# Patient Record
Sex: FEMALE | Race: WHITE | URBAN - METROPOLITAN AREA
[De-identification: names, ages, dates, MRNs, and addresses within clinical notes are randomized per-mention and may not be internally consistent; named-entity substitution may affect disease eponyms.]

---

## 2017-04-15 ENCOUNTER — TRANSFERRED RECORDS (OUTPATIENT)
Dept: HEALTH INFORMATION MANAGEMENT | Facility: CLINIC | Age: 22
End: 2017-04-15

## 2017-04-15 LAB — PAP SMEAR - HIM PATIENT REPORTED: NEGATIVE

## 2017-06-12 ENCOUNTER — OFFICE VISIT (OUTPATIENT)
Dept: PEDIATRICS | Facility: CLINIC | Age: 22
End: 2017-06-12
Payer: COMMERCIAL

## 2017-06-12 VITALS
TEMPERATURE: 98.5 F | SYSTOLIC BLOOD PRESSURE: 118 MMHG | HEART RATE: 98 BPM | HEIGHT: 64 IN | DIASTOLIC BLOOD PRESSURE: 66 MMHG | OXYGEN SATURATION: 99 % | BODY MASS INDEX: 24.48 KG/M2 | WEIGHT: 143.4 LBS

## 2017-06-12 DIAGNOSIS — B96.89 BACTERIAL VAGINOSIS: Primary | ICD-10-CM

## 2017-06-12 DIAGNOSIS — Z11.3 SCREEN FOR STD (SEXUALLY TRANSMITTED DISEASE): ICD-10-CM

## 2017-06-12 DIAGNOSIS — N89.8 VAGINAL DISCHARGE: Primary | ICD-10-CM

## 2017-06-12 DIAGNOSIS — N76.0 BACTERIAL VAGINOSIS: Primary | ICD-10-CM

## 2017-06-12 LAB
MICRO REPORT STATUS: ABNORMAL
SPECIMEN SOURCE: ABNORMAL
WET PREP SPEC: ABNORMAL

## 2017-06-12 PROCEDURE — 99213 OFFICE O/P EST LOW 20 MIN: CPT | Performed by: NURSE PRACTITIONER

## 2017-06-12 PROCEDURE — 87210 SMEAR WET MOUNT SALINE/INK: CPT | Performed by: NURSE PRACTITIONER

## 2017-06-12 PROCEDURE — 87591 N.GONORRHOEAE DNA AMP PROB: CPT | Performed by: NURSE PRACTITIONER

## 2017-06-12 PROCEDURE — 87491 CHLMYD TRACH DNA AMP PROBE: CPT | Performed by: NURSE PRACTITIONER

## 2017-06-12 RX ORDER — METRONIDAZOLE 500 MG/1
500 TABLET ORAL 2 TIMES DAILY
Qty: 14 TABLET | Refills: 0 | Status: SHIPPED | OUTPATIENT
Start: 2017-06-12 | End: 2017-07-06
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
## 2017-06-12 NOTE — MR AVS SNAPSHOT
"              After Visit Summary   2017    Briana Mays    MRN: [id number]           Patient Information     Date Of Birth          1995        Visit Information        Provider Department      2017 3:00 PM Mackenzie Cowart APRN CNP [facility]        Today's Diagnoses     Vaginal discharge    -  1    Screen for STD (sexually transmitted disease)          Care Instructions    Bacterial Vaginosis          Follow-ups after your visit        Who to contact     If you have questions or need follow up information about today's clinic visit or your schedule please contact [facility] directly at [phone].  Normal or non-critical lab and imaging results will be communicated to you by [employer], letter or phone within 4 business days after the clinic has received the results. If you do not hear from us within 7 days, please contact the clinic through [employer] or phone. If you have a critical or abnormal lab result, we will notify you by phone as soon as possible.  Submit refill requests through [employer] or call your pharmacy and they will forward the refill request to us. Please allow 3 business days for your refill to be completed.          Additional Information About Your Visit        MyChart Information     [employer] lets you send messages to your doctor, view your test results, renew your prescriptions, schedule appointments and more. To sign up, go to [email or web address] . Click on \"Log in\" on the left side of the screen, which will take you to the Welcome page. Then click on \"Sign up Now\" on the right side of the page.     You will be asked to enter the access code listed below, as well as some personal information. Please follow the directions to create your username and password.     Your access code is: [email or web address]  Expires: 9/10/2017  3:49 PM     Your access code will  in 90 days. If you need help or a new code, please call your [place] clinic or " "[phone].        Care EveryWhere ID     This is your Care EveryWhere ID. This could be used by other organizations to access your [place] medical records  [id number]        Your Vitals Were     Pulse Temperature Height Pulse Oximetry BMI (Body Mass Index)       98 98.5  F (36.9  C) (Tympanic) 5' 3.5\" (1.613 m) 99% 25 kg/m2        Blood Pressure from Last 3 Encounters:   06/12/17 118/66   08/05/15 113/76   07/08/15 131/80    Weight from Last 3 Encounters:   06/12/17 143 lb 6.4 oz (65 kg)   08/05/15 141 lb 11.2 oz (64.3 kg)   07/08/15 141 lb 11.2 oz (64.3 kg)              We Performed the Following     CHLAMYDIA TRACHOMATIS PCR     NEISSERIA GONORRHOEA PCR     Wet prep          Today's Medication Changes          These changes are accurate as of: 6/12/17  3:49 PM.  If you have any questions, ask your nurse or doctor.               Start taking these medicines.        Dose/Directions    metroNIDAZOLE 500 MG tablet   Commonly known as:  FLAGYL   Used for:  Vaginal discharge   Started by:  Mackenzie Cowart APRN CNP        Dose:  500 mg   Take 1 tablet (500 mg) by mouth 2 times daily   Quantity:  14 tablet   Refills:  0            Where to get your medicines      These medications were sent to [place] [address] [place] [address]     Phone:  [phone]     metroNIDAZOLE 500 MG tablet                Primary Care Provider Fax #    [facility] [phone]       No address on file        Thank you!     Thank you for choosing [facility]  for your care. Our goal is always to provide you with excellent care. Hearing back from our patients is one way we can continue to improve our services. Please take a few minutes to complete the written survey that you may receive in the mail after your visit with us. Thank you!             Your Updated Medication List - Protect others around you: Learn how to safely use, store and throw away your " medicines at www.disposemymeds.org.          This list is accurate as of: 6/12/17  3:49 PM.  Always use your most recent med list.                   Brand Name Dispense Instructions for use    AMOXICILLIN PO      Take 500 mg by mouth 2 times daily For acne       levonorgestrel 20 MCG/24HR IUD    MIRENA    1 each    1 each (20 mcg) by Intrauterine route once for 1 dose       metroNIDAZOLE 500 MG tablet    FLAGYL    14 tablet    Take 1 tablet (500 mg) by mouth 2 times daily       spironolactone 50 MG tablet    ALDACTONE     Take by mouth daily

## 2017-06-12 NOTE — NURSING NOTE
"Chief Complaint   Patient presents with     Vaginal Problem     recurring yeast infection       Initial /66 (Cuff Size: Adult Regular)  Pulse 98  Temp 98.5  F (36.9  C) (Tympanic)  Ht 5' 3.5\" (1.613 m)  Wt 143 lb 6.4 oz (65 kg)  SpO2 99%  BMI 25 kg/m2 Estimated body mass index is 25 kg/(m^2) as calculated from the following:    Height as of this encounter: 5' 3.5\" (1.613 m).    Weight as of this encounter: 143 lb 6.4 oz (65 kg).  Medication Reconciliation: complete   Lety Powell, CONRAD    "

## 2017-06-12 NOTE — TELEPHONE ENCOUNTER
Patient calling to request RX for vaginal application of Metronidazole vs pill form.  She doesn't want to use Flagyl po due to interaction with alcohol.  Please send new RX to pharmacy.  Order pended.  ROSARIO Koch RN

## 2017-06-13 LAB
C TRACH DNA SPEC QL NAA+PROBE: NORMAL
N GONORRHOEA DNA SPEC QL NAA+PROBE: NORMAL
SPECIMEN SOURCE: NORMAL
SPECIMEN SOURCE: NORMAL

## 2017-06-13 RX ORDER — METRONIDAZOLE 7.5 MG/G
1 GEL VAGINAL AT BEDTIME
Qty: 70 G | Refills: 0 | Status: SHIPPED | OUTPATIENT
Start: 2017-06-13 | End: 2017-06-18

## 2017-06-26 ENCOUNTER — TELEPHONE (OUTPATIENT)
Dept: PEDIATRICS | Facility: CLINIC | Age: 22
End: 2017-06-26

## 2017-06-26 NOTE — TELEPHONE ENCOUNTER
Panel Management Review      Patient has the following on her problem list: None      Composite cancer screening  Chart review shows that this patient is due/due soon for the following Pap Smear  Summary:    Patient is due/failing the following:   PAP and PHYSICAL    Action needed:   Patient needs office visit for pap & physical.    Type of outreach:    Phone, spoke to patient.  Patient states had pap at [facility] 4/15/17 - normal result.    Questions for provider review:    None                                                                                                                    Lety Powell CMA    Chart closed .

## 2017-07-05 NOTE — PROGRESS NOTES
SUBJECTIVE:                                                    Briana Mays is a 22 year old female who presents to clinic today for the following health issues:    New Patient/Transfer of Care    Medication Followup of Tazarac, spironolactone, amxocicillin (for cystic acne)    Taking Medication as prescribed: yes    Side Effects:  None    Medication Helping Symptoms:  yes   Stable on these meds. Used to have terrible cystic acne. Recently had lab work through [facility].    Declines HPV vaccination today.    Recently treated for yeast infection. Still somewhat itchy.    ROS: const/derm/gyn otherwise negative     OBJECTIVE:  /60 (BP Location: Right arm, Patient Position: Chair, Cuff Size: Adult Regular)  Pulse 101  Resp 12  Wt 140 lb 6.4 oz (63.7 kg)  SpO2 98%  BMI 24.48 kg/m2  CONSTITUTIONAL: Alert, well-nourished, well-groomed, NAD  RESP: Lungs CTA. No wheeze, rhonchi, rales.  CV: HRRR S1 S2 No MRG. No peripheral edema  DERM: Clear skin     ASSESSMENT/PLAN:  (L70.9) Acne, unspecified acne type  (primary encounter diagnosis)  Comment: On stable regimen. Had recent labwork done.   Plan: levonorgestrel (MIRENA, 52 MG,) 20 MCG/24HR         IUD, Tazarotene, Facial Wrinkles, 0.1 % CREA,         amoxicillin (AMOXIL) 500 MG capsule,         spironolactone (ALDACTONE) 50 MG tablet,         DISCONTINUED: Tazarotene, Facial Wrinkles, 0.1         % CREA        RTC 1 year.     (L29.8) Vaginal itching  Recently treated for yeast, still itching.   Plan: Wet prep, fluconazole (DIFLUCAN) 150 MG tablet              BRITNI Nelson.

## 2017-07-06 ENCOUNTER — OFFICE VISIT (OUTPATIENT)
Dept: PEDIATRICS | Facility: CLINIC | Age: 22
End: 2017-07-06
Payer: COMMERCIAL

## 2017-07-06 VITALS
WEIGHT: 140.4 LBS | OXYGEN SATURATION: 98 % | RESPIRATION RATE: 12 BRPM | DIASTOLIC BLOOD PRESSURE: 60 MMHG | SYSTOLIC BLOOD PRESSURE: 118 MMHG | HEART RATE: 101 BPM | BODY MASS INDEX: 24.48 KG/M2

## 2017-07-06 DIAGNOSIS — N89.8 VAGINAL ITCHING: ICD-10-CM

## 2017-07-06 DIAGNOSIS — L70.9 ACNE, UNSPECIFIED ACNE TYPE: Primary | ICD-10-CM

## 2017-07-06 LAB
MICRO REPORT STATUS: NORMAL
SPECIMEN SOURCE: NORMAL
WET PREP SPEC: NORMAL

## 2017-07-06 PROCEDURE — 99214 OFFICE O/P EST MOD 30 MIN: CPT | Performed by: NURSE PRACTITIONER

## 2017-07-06 PROCEDURE — 87210 SMEAR WET MOUNT SALINE/INK: CPT | Performed by: NURSE PRACTITIONER

## 2017-07-06 RX ORDER — FLUCONAZOLE 150 MG/1
150 TABLET ORAL ONCE
Qty: 1 TABLET | Refills: 0 | Status: SHIPPED | OUTPATIENT
Start: 2017-07-06 | End: 2017-07-06

## 2017-07-06 RX ORDER — AMOXICILLIN 500 MG/1
500 CAPSULE ORAL 2 TIMES DAILY
Qty: 180 CAPSULE | Refills: 3 | Status: SHIPPED | OUTPATIENT
Start: 2017-07-06

## 2017-07-06 RX ORDER — SPIRONOLACTONE 50 MG/1
TABLET, FILM COATED ORAL
Qty: 60 TABLET | Refills: 1 | Status: SHIPPED | OUTPATIENT
Start: 2017-07-06 | End: 2017-07-20

## 2017-07-06 NOTE — MR AVS SNAPSHOT
"              After Visit Summary   2017    Briana Mays    MRN: [id number]           Patient Information     Date Of Birth          1995        Visit Information        Provider Department      2017 8:20 AM Mackenzie Cowart APRN CNP [facility]        Today's Diagnoses     Acne, unspecified acne type    -  1    Vaginal itching           Follow-ups after your visit        Who to contact     If you have questions or need follow up information about today's clinic visit or your schedule please contact [facility] directly at [phone].  Normal or non-critical lab and imaging results will be communicated to you by [facility], letter or phone within 4 business days after the clinic has received the results. If you do not hear from us within 7 days, please contact the clinic through [facility] or phone. If you have a critical or abnormal lab result, we will notify you by phone as soon as possible.  Submit refill requests through [facility] or call your pharmacy and they will forward the refill request to us. Please allow 3 business days for your refill to be completed.          Additional Information About Your Visit        MyChart Information     [facility] lets you send messages to your doctor, view your test results, renew your prescriptions, schedule appointments and more. To sign up, go to [email or web address] . Click on \"Log in\" on the left side of the screen, which will take you to the Welcome page. Then click on \"Sign up Now\" on the right side of the page.     You will be asked to enter the access code listed below, as well as some personal information. Please follow the directions to create your username and password.     Your access code is: [email or web address]  Expires: 9/10/2017  3:49 PM     Your access code will  in 90 days. If you need help or a new code, please call your [place] clinic or [phone].        Care EveryWhere ID     This is your Care EveryWhere ID. " This could be used by other organizations to access your [place] medical records  [id number]        Your Vitals Were     Pulse Respirations Pulse Oximetry BMI (Body Mass Index)          101 12 98% 24.48 kg/m2         Blood Pressure from Last 3 Encounters:   07/06/17 118/60   06/12/17 118/66   08/05/15 113/76    Weight from Last 3 Encounters:   07/06/17 140 lb 6.4 oz (63.7 kg)   06/12/17 143 lb 6.4 oz (65 kg)   08/05/15 141 lb 11.2 oz (64.3 kg)              We Performed the Following     Wet prep          Today's Medication Changes          These changes are accurate as of: 7/6/17  8:59 AM.  If you have any questions, ask your nurse or doctor.               Start taking these medicines.        Dose/Directions    fluconazole 150 MG tablet   Commonly known as:  DIFLUCAN   Used for:  Vaginal itching   Started by:  Mackenzie Cowart APRN CNP        Dose:  150 mg   Take 1 tablet (150 mg) by mouth once for 1 dose   Quantity:  1 tablet   Refills:  0         These medicines have changed or have updated prescriptions.        Dose/Directions    * AMOXICILLIN PO   This may have changed:  Another medication with the same name was added. Make sure you understand how and when to take each.   Changed by:  Mackenzie Cowart APRN CNP        Dose:  500 mg   Take 500 mg by mouth 2 times daily For acne   Refills:  0       * amoxicillin 500 MG capsule   Commonly known as:  AMOXIL   This may have changed:  You were already taking a medication with the same name, and this prescription was added. Make sure you understand how and when to take each.   Used for:  Acne, unspecified acne type   Changed by:  Mackenzie Cowart APRN CNP        Dose:  500 mg   Take 1 capsule (500 mg) by mouth 2 times daily   Quantity:  180 capsule   Refills:  3       * spironolactone 50 MG tablet   Commonly known as:  ALDACTONE   This may have changed:  Another medication with the same name was added. Make sure you understand how and when to  take each.   Changed by:  Ailyn Ye APRN CNP        Take by mouth daily   Refills:  0       * spironolactone 50 MG tablet   Commonly known as:  ALDACTONE   This may have changed:  You were already taking a medication with the same name, and this prescription was added. Make sure you understand how and when to take each.   Used for:  Acne, unspecified acne type   Changed by:  Mackenzie Cowart APRN CNP        Take 2 tabs at hs and 1 tab in the morning.   Quantity:  60 tablet   Refills:  1       Tazarotene (Facial Wrinkles) 0.1 % Crea   This may have changed:  when to take this   Used for:  Acne, unspecified acne type   Changed by:  Mackenzie Cowart APRN CNP        Apply topically At Bedtime   Quantity:  30 g   Refills:  3       * Notice:  This list has 4 medication(s) that are the same as other medications prescribed for you. Read the directions carefully, and ask your doctor or other care provider to review them with you.         Where to get your medicines      These medications were sent to [place] [address] [place] [facility] [address] [address]     Phone:  [phone]     amoxicillin 500 MG capsule    fluconazole 150 MG tablet    spironolactone 50 MG tablet    Tazarotene (Facial Wrinkles) 0.1 % Crea                Primary Care Provider Fax #    [facility] [phone]       No address on file        Equal Access to Services     JAYNA FOSS : Hadmiels boscho Soletty, waaxda luqadaha, qaybta kaalmada no abdalla. So [facility] [phone].    ATENCIÓN: Si habla español, tiene a hartman disposición servicios gratuitos de asistencia lingüística. Javier al [phone].    We comply with applicable federal civil rights laws and [place] laws. We do not discriminate on the basis of race, color, national origin, age, disability sex, sexual orientation or gender identity.            Thank you!     Thank you  for choosing [facility]  for your care. Our goal is always to provide you with excellent care. Hearing back from our patients is one way we can continue to improve our services. Please take a few minutes to complete the written survey that you may receive in the mail after your visit with us. Thank you!             Your Updated Medication List - Protect others around you: Learn how to safely use, store and throw away your medicines at www.disposemymeds.org.          This list is accurate as of: 7/6/17  8:59 AM.  Always use your most recent med list.                   Brand Name Dispense Instructions for use Diagnosis    * AMOXICILLIN PO      Take 500 mg by mouth 2 times daily For acne        * amoxicillin 500 MG capsule    AMOXIL    180 capsule    Take 1 capsule (500 mg) by mouth 2 times daily    Acne, unspecified acne type       fluconazole 150 MG tablet    DIFLUCAN    1 tablet    Take 1 tablet (150 mg) by mouth once for 1 dose    Vaginal itching       * MIRENA (52 MG) 20 MCG/24HR IUD   Generic drug:  levonorgestrel      1 each by Intrauterine route once    Acne, unspecified acne type       * levonorgestrel 20 MCG/24HR IUD    MIRENA    1 each    1 each (20 mcg) by Intrauterine route once for 1 dose    Encounter for IUD insertion       * spironolactone 50 MG tablet    ALDACTONE     Take by mouth daily        * spironolactone 50 MG tablet    ALDACTONE    60 tablet    Take 2 tabs at hs and 1 tab in the morning.    Acne, unspecified acne type       Tazarotene (Facial Wrinkles) 0.1 % Crea     30 g    Apply topically At Bedtime    Acne, unspecified acne type       * Notice:  This list has 6 medication(s) that are the same as other medications prescribed for you. Read the directions carefully, and ask your doctor or other care provider to review them with you.

## 2017-07-06 NOTE — NURSING NOTE
"Chief Complaint   Patient presents with     Recheck Medication     Establish Care       Initial /60 (BP Location: Right arm, Patient Position: Chair, Cuff Size: Adult Regular)  Pulse 101  Resp 12  Wt 140 lb 6.4 oz (63.7 kg)  SpO2 98%  BMI 24.48 kg/m2 Estimated body mass index is 24.48 kg/(m^2) as calculated from the following:    Height as of 6/12/17: 5' 3.5\" (1.613 m).    Weight as of this encounter: 140 lb 6.4 oz (63.7 kg).  Medication Reconciliation: complete  Keren Fam, CONRAD  "

## 2017-07-20 DIAGNOSIS — L70.9 ACNE, UNSPECIFIED ACNE TYPE: ICD-10-CM

## 2017-07-20 RX ORDER — SPIRONOLACTONE 50 MG/1
TABLET, FILM COATED ORAL
Qty: 270 TABLET | Refills: 1 | Status: SHIPPED | OUTPATIENT
Start: 2017-07-20

## 2017-07-20 NOTE — TELEPHONE ENCOUNTER
Pt. Requesting 3 month supply of medication sent as her insurance will end as of 7/31//2017 (she is a college student) will only have a 2 month gap (aug &sept).    Amoxicillin- Verified with patient that a 3 month supply was sent as of 7/6/2017. She confirmed she will not need this.    Tazarotene Cream- sent to pharmacy for #90    Nikky NIX RN, BSN, PHN  Sheela Giles RN

## 2017-07-20 NOTE — TELEPHONE ENCOUNTER
Spironolactone      Last Written Prescription Date: 7/6/2017  Last Fill Quantity: 60, # refills: 1  Last Office Visit with [facility], [facility] or [facility] prescribing provider: 7/6/2017       No results found for: POTASSIUM  No results found for: CR  BP Readings from Last 3 Encounters:   07/06/17 118/60   06/12/17 118/66   08/05/15 113/76     Routing refill request to provider for review/approval because:  Please advise if ok to send in 3 month supply. Dx: Acne.    Nikky NIX RN, BSN, PHN  [facility]

## 2017-07-21 ENCOUNTER — TELEPHONE (OUTPATIENT)
Dept: PEDIATRICS | Facility: CLINIC | Age: 22
End: 2017-07-21

## 2017-07-21 NOTE — TELEPHONE ENCOUNTER
Call from pharmacy regarding:    Tazarotene, Facial Wrinkles, 0.1 % CREA 90 g 0 7/20/2017  No   Sig: Apply topically At Bedtime     Looking for instructions and where to use the cream on to be able to calculate a month supply.    Call to patient-Gets 2 tubes of 30 mg per month, so a total of 60 g per month. Uses on her face, back, chest and arms nightly.    Pharmacy was called and advised.  Lazara Alarcon RN  Message handled by Nurse Triage.

## 2017-08-16 ENCOUNTER — TELEPHONE (OUTPATIENT)
Dept: PEDIATRICS | Facility: CLINIC | Age: 22
End: 2017-08-16

## 2017-08-16 DIAGNOSIS — N89.8 VAGINAL DISCHARGE: ICD-10-CM

## 2017-08-16 DIAGNOSIS — N89.8 VAGINAL DISCHARGE: Primary | ICD-10-CM

## 2017-08-16 LAB
SPECIMEN SOURCE: NORMAL
WET PREP SPEC: NORMAL

## 2017-08-16 PROCEDURE — 87210 SMEAR WET MOUNT SALINE/INK: CPT | Performed by: NURSE PRACTITIONER

## 2017-08-16 NOTE — TELEPHONE ENCOUNTER
Patient notified.  Patient has [employer] insurance and will cover catastrophies only.  No coverage for office appointment or medications.  ROSARIO Koch RN

## 2017-08-16 NOTE — TELEPHONE ENCOUNTER
Patient calling with concerns she may have BV.  Symptoms of vaginal itching.  Vaginal discharge that is white.  Area is sensitive.  Spotting.  Patient has an IUD.    No urinary symptoms.  No fever.    Last treated for BV in June.  Patient has no insurance coverage now and states that Mackenzie told her if this recurred she would just order the lab test for her due to financial issues.  ROSARIO Koch RN

## 2017-08-17 NOTE — TELEPHONE ENCOUNTER
Patient notified that results are negative.  Would it be worth checking for STDs?  STD testing was done in June and was negative.  Patient still with symptoms and asking what could be causing this-notes burning of skin in the perineal area.  Denies urinary symptoms.  No fever.  Patient is sexually active-needs to be checked for any sores or lesions.  Advised that she needs to be seen if she is having a lot of discomfort.  Gave her the number for [facility].  May also check with [facility]-sliding scale payment.  Patient will look into these options.  ROSARIO Koch RN

## 2017-08-17 NOTE — TELEPHONE ENCOUNTER
Ok,    Please let her know I'm ok doing this through the end of the year but after that will need to do e-visits ($35). Thanks!

## 2017-08-17 NOTE — TELEPHONE ENCOUNTER
I ordered GONORRHEA and chlamydia if she wants. Typically $100 per test. I recommend Zumbrota or PP.  Burning is likely due to contact dermatitis. Underwear, new detergent, spermicide, condoms, etc.     Thanks!  Mackenzie

## 2017-08-17 NOTE — TELEPHONE ENCOUNTER
Patient states she has the same partner.  Had STD testing done thru [facility] last month and this was negative.  Uses Hypoallergenic soaps, and no new detergents.  ROASRIO Koch RN

## 2017-11-03 DIAGNOSIS — Z79.899 NEED FOR PROPHYLACTIC CHEMOTHERAPY: ICD-10-CM

## 2017-11-03 PROCEDURE — 80061 LIPID PANEL: CPT

## 2017-11-03 PROCEDURE — 84450 TRANSFERASE (AST) (SGOT): CPT

## 2017-11-03 PROCEDURE — 84460 ALANINE AMINO (ALT) (SGPT): CPT

## 2017-11-03 PROCEDURE — 36415 COLL VENOUS BLD VENIPUNCTURE: CPT

## 2017-11-04 LAB
ALT SERPL W P-5'-P-CCNC: 28 U/L (ref 0–50)
AST SERPL W P-5'-P-CCNC: 23 U/L (ref 0–45)
CHOLEST SERPL-MCNC: 182 MG/DL
HDLC SERPL-MCNC: 63 MG/DL
LDLC SERPL CALC-MCNC: 105 MG/DL
NONHDLC SERPL-MCNC: 119 MG/DL
TRIGL SERPL-MCNC: 68 MG/DL

## 2017-11-30 DIAGNOSIS — Z79.899 NEED FOR PROPHYLACTIC CHEMOTHERAPY: ICD-10-CM

## 2017-11-30 PROCEDURE — 84450 TRANSFERASE (AST) (SGOT): CPT

## 2017-11-30 PROCEDURE — 36415 COLL VENOUS BLD VENIPUNCTURE: CPT

## 2017-11-30 PROCEDURE — 80061 LIPID PANEL: CPT

## 2017-11-30 PROCEDURE — 84460 ALANINE AMINO (ALT) (SGPT): CPT

## 2017-12-01 LAB
ALT SERPL W P-5'-P-CCNC: 32 U/L (ref 0–50)
AST SERPL W P-5'-P-CCNC: 26 U/L (ref 0–45)
CHOLEST SERPL-MCNC: 152 MG/DL
HDLC SERPL-MCNC: 45 MG/DL
LDLC SERPL CALC-MCNC: 74 MG/DL
NONHDLC SERPL-MCNC: 107 MG/DL
TRIGL SERPL-MCNC: 164 MG/DL

## 2018-01-03 DIAGNOSIS — Z79.899 NEED FOR PROPHYLACTIC CHEMOTHERAPY: ICD-10-CM

## 2018-01-03 PROCEDURE — 36415 COLL VENOUS BLD VENIPUNCTURE: CPT

## 2018-01-03 PROCEDURE — 84450 TRANSFERASE (AST) (SGOT): CPT

## 2018-01-03 PROCEDURE — 84460 ALANINE AMINO (ALT) (SGPT): CPT

## 2018-01-03 PROCEDURE — 80061 LIPID PANEL: CPT

## 2018-01-04 LAB
ALT SERPL W P-5'-P-CCNC: 22 U/L (ref 0–50)
AST SERPL W P-5'-P-CCNC: 23 U/L (ref 0–45)
CHOLEST SERPL-MCNC: 208 MG/DL
HDLC SERPL-MCNC: 59 MG/DL
LDLC SERPL CALC-MCNC: 112 MG/DL
NONHDLC SERPL-MCNC: 149 MG/DL
TRIGL SERPL-MCNC: 187 MG/DL

## 2018-01-31 DIAGNOSIS — Z79.899 NEED FOR PROPHYLACTIC CHEMOTHERAPY: ICD-10-CM

## 2018-01-31 PROCEDURE — 80061 LIPID PANEL: CPT

## 2018-01-31 PROCEDURE — 36415 COLL VENOUS BLD VENIPUNCTURE: CPT

## 2018-01-31 PROCEDURE — 84450 TRANSFERASE (AST) (SGOT): CPT

## 2018-01-31 PROCEDURE — 84460 ALANINE AMINO (ALT) (SGPT): CPT

## 2018-02-01 LAB
ALT SERPL W P-5'-P-CCNC: 24 U/L (ref 0–50)
AST SERPL W P-5'-P-CCNC: 22 U/L (ref 0–45)
CHOLEST SERPL-MCNC: 197 MG/DL
HDLC SERPL-MCNC: 49 MG/DL
LDLC SERPL CALC-MCNC: 104 MG/DL
NONHDLC SERPL-MCNC: 148 MG/DL
TRIGL SERPL-MCNC: 221 MG/DL

## 2018-02-28 DIAGNOSIS — Z79.899 NEED FOR PROPHYLACTIC CHEMOTHERAPY: ICD-10-CM

## 2018-02-28 PROCEDURE — 84460 ALANINE AMINO (ALT) (SGPT): CPT

## 2018-02-28 PROCEDURE — 84450 TRANSFERASE (AST) (SGOT): CPT

## 2018-02-28 PROCEDURE — 80061 LIPID PANEL: CPT

## 2018-02-28 PROCEDURE — 36415 COLL VENOUS BLD VENIPUNCTURE: CPT

## 2018-03-01 LAB
ALT SERPL W P-5'-P-CCNC: 18 U/L (ref 0–50)
AST SERPL W P-5'-P-CCNC: 17 U/L (ref 0–45)
CHOLEST SERPL-MCNC: 184 MG/DL
HDLC SERPL-MCNC: 56 MG/DL
LDLC SERPL CALC-MCNC: 93 MG/DL
NONHDLC SERPL-MCNC: 128 MG/DL
TRIGL SERPL-MCNC: 175 MG/DL

## 2018-03-28 DIAGNOSIS — Z79.899 NEED FOR PROPHYLACTIC CHEMOTHERAPY: ICD-10-CM

## 2018-03-28 PROCEDURE — 84460 ALANINE AMINO (ALT) (SGPT): CPT

## 2018-03-28 PROCEDURE — 80061 LIPID PANEL: CPT

## 2018-03-28 PROCEDURE — 36415 COLL VENOUS BLD VENIPUNCTURE: CPT

## 2018-03-28 PROCEDURE — 84450 TRANSFERASE (AST) (SGOT): CPT

## 2018-03-29 LAB
ALT SERPL W P-5'-P-CCNC: 21 U/L (ref 0–50)
AST SERPL W P-5'-P-CCNC: 23 U/L (ref 0–45)
CHOLEST SERPL-MCNC: 202 MG/DL
HDLC SERPL-MCNC: 50 MG/DL
LDLC SERPL CALC-MCNC: 118 MG/DL
NONHDLC SERPL-MCNC: 152 MG/DL
TRIGL SERPL-MCNC: 172 MG/DL

## 2018-04-25 DIAGNOSIS — Z79.899 NEED FOR PROPHYLACTIC CHEMOTHERAPY: ICD-10-CM

## 2018-04-25 PROCEDURE — 84450 TRANSFERASE (AST) (SGOT): CPT | Performed by: INTERNAL MEDICINE

## 2018-04-25 PROCEDURE — 84460 ALANINE AMINO (ALT) (SGPT): CPT | Performed by: INTERNAL MEDICINE

## 2018-04-25 PROCEDURE — 36415 COLL VENOUS BLD VENIPUNCTURE: CPT | Performed by: INTERNAL MEDICINE

## 2018-04-25 PROCEDURE — 80061 LIPID PANEL: CPT | Performed by: INTERNAL MEDICINE

## 2018-04-26 LAB
ALT SERPL W P-5'-P-CCNC: 31 U/L (ref 0–50)
AST SERPL W P-5'-P-CCNC: 26 U/L (ref 0–45)
CHOLEST SERPL-MCNC: 169 MG/DL
HDLC SERPL-MCNC: 50 MG/DL
LDLC SERPL CALC-MCNC: 85 MG/DL
NONHDLC SERPL-MCNC: 119 MG/DL
TRIGL SERPL-MCNC: 171 MG/DL

## 2018-05-23 DIAGNOSIS — Z79.899 NEED FOR PROPHYLACTIC CHEMOTHERAPY: ICD-10-CM

## 2018-05-23 PROCEDURE — 80061 LIPID PANEL: CPT | Performed by: INTERNAL MEDICINE

## 2018-05-23 PROCEDURE — 36415 COLL VENOUS BLD VENIPUNCTURE: CPT | Performed by: INTERNAL MEDICINE

## 2018-05-23 PROCEDURE — 84460 ALANINE AMINO (ALT) (SGPT): CPT | Performed by: INTERNAL MEDICINE

## 2018-05-23 PROCEDURE — 84450 TRANSFERASE (AST) (SGOT): CPT | Performed by: INTERNAL MEDICINE

## 2018-05-24 LAB
ALT SERPL W P-5'-P-CCNC: 32 U/L (ref 0–50)
AST SERPL W P-5'-P-CCNC: 22 U/L (ref 0–45)
CHOLEST SERPL-MCNC: 216 MG/DL
HDLC SERPL-MCNC: 52 MG/DL
LDLC SERPL CALC-MCNC: 132 MG/DL
NONHDLC SERPL-MCNC: 164 MG/DL
TRIGL SERPL-MCNC: 159 MG/DL

## 2020-02-20 ENCOUNTER — NURSE TRIAGE (OUTPATIENT)
Dept: NURSING | Facility: CLINIC | Age: 25
End: 2020-02-20

## 2020-02-21 ENCOUNTER — NURSE TRIAGE (OUTPATIENT)
Dept: NURSING | Facility: CLINIC | Age: 25
End: 2020-02-21

## 2020-02-21 ENCOUNTER — TELEPHONE (OUTPATIENT)
Dept: NURSING | Facility: CLINIC | Age: 25
End: 2020-02-21

## 2020-02-21 ENCOUNTER — TELEPHONE (OUTPATIENT)
Dept: PEDIATRICS | Facility: CLINIC | Age: 25
End: 2020-02-21

## 2020-02-21 NOTE — TELEPHONE ENCOUNTER
Patient calling for [place] clinic number and had a question regarding an IUD that was placed in 2015. Denies triage, advised to call clinic in am when open.  Yamile Terry RN [facility]

## 2020-02-21 NOTE — TELEPHONE ENCOUNTER
Patient [facility] had an IUD placed in 2015 and needs clarification from PCP when the IUD needs to be replaced. [facility] called yesterday with same question and has not gotten through to [facility].  [facility] is up to date on well woman care. [facility] has seen various providers.    This RN gave Patient the correct [facility] number. Also offered to send PCP a Telephone message to call Patient at [phone].    Protocol-  Information Only Call- Adult  Care advice reviewed.   Disposition-  Information given. Will send a Telephone message to PCP.  Message sent.  Caller states understanding of the recommended disposition.   Advised to call back if further questions or concerns.     KEYSHA Soria RN  [facility]     Reason for Disposition    [1] Caller requesting NON-URGENT health information AND [2] PCP's office is the best resource    Protocols used: INFORMATION ONLY CALL-A-

## 2020-02-21 NOTE — TELEPHONE ENCOUNTER
Patient Returning Call  Reason for call:  IUD Questions  Information relayed to patient:  Nurse will call back to advise  Patient has additional questions:  Yes  What are your questions/concerns:  wants to know the type of IUD she has, and how long it is good for, as well as if it should be switched out  Okay to leave a detailed message?: Yes at Cell number on file:    Telephone Information:   Mobile [phone]

## 2020-02-21 NOTE — TELEPHONE ENCOUNTER
Called pt and left her a message. She has a Mirena IUD. Inserted in 7/8/2015 by Ailyn Green NP. It is good for 5 years. Should be replaced by 7/8/2020. Shauna Menchaca RN on 2/21/2020 at 10:29 AM

## 2020-02-21 NOTE — TELEPHONE ENCOUNTER
Clinic Action Needed:  Yes    Reason for Call:  Please call the Patient at [phone].    Patient states had an IUD placed in 2015 and needs clarification from PCP when the IUD needs to be replaced. States called yesterday with same question and has not gotten through to [facility].  [facility] is up to date on well woman care. [facility] has seen various providers.    This RN gave Patient the correct [facility] number. Also offered to send PCP a Telephone message to call Patient at [phone].    Protocol-  Information Only Call- Adult  Care advice reviewed.   Disposition-  Information given. Will send a Telephone message to PCP.  Caller states understanding of the recommended disposition.   Advised to call back if further questions or concerns.     Patient states had an IUD placed in 2015 and needs clarification from PCP when the IUD needs to be replaced. States called yesterday with same question and has not gotten through to [facility].  [facility] is up to date on well woman care. [facility] has seen various providers.    This RN gave Patient the correct [facility] number. Also offered to send PCP a Telephone message to call Patient at [phone].    Protocol-  Information Only Call- Adult  Care advice reviewed.   Disposition-  Information given. Will send a Telephone message to PCP.  Caller states understanding of the recommended disposition.   Advised to call back if further questions or concerns.     Routed to: VERO GARCIA / [facility] / KEYSHA Gibbs RN  [facility]                  Routed to: